# Patient Record
Sex: FEMALE | Race: BLACK OR AFRICAN AMERICAN | URBAN - METROPOLITAN AREA
[De-identification: names, ages, dates, MRNs, and addresses within clinical notes are randomized per-mention and may not be internally consistent; named-entity substitution may affect disease eponyms.]

---

## 2019-01-22 ENCOUNTER — TELEPHONE (OUTPATIENT)
Dept: FAMILY MEDICINE CLINIC | Age: 19
End: 2019-01-22

## 2019-01-22 NOTE — TELEPHONE ENCOUNTER
----- Message from [address] sent at 1/22/2019  9:06 AM EST -----  Regarding: Dr. Pedro Chaney  Pt is requesting a NP appointment after 3:00 for birth control. Best contact number is [phone].

## 2019-01-22 NOTE — TELEPHONE ENCOUNTER
Called and spoke with the patient who was informed of [facility] policy on birth control. Said her mother just wants her to go on it and she will speak to her mother about this. No appointment has been scheduled.